# Patient Record
(demographics unavailable — no encounter records)

---

## 2025-01-22 NOTE — ADDENDUM
[FreeTextEntry1] :  Documented by Norris Hill acting as a scribe for Dr. DIAZ [facility], on 01/22/2025.

## 2025-01-22 NOTE — ASSESSMENT
[FreeTextEntry1] : Shara is a 90 y/o F who is here for a 6 month f/u visit for Right breast lumpectomy for DCIs from 7/16/24. ER/UT negative Pt is healing well. On physical exam, everything looks normal. Pt does not have any other complaints at this time.  total time on encounter: 40 mins PLAN: - b/l Mammo in 4 months  - f/u after imaging with the PA

## 2025-01-22 NOTE — HISTORY OF PRESENT ILLNESS
[FreeTextEntry1] : Shara is a 90 y/o F who is here for a 6 month f/u visit for Right breast lumpectomy for DCIs from 7/16/24. ER/CA negative Pt states of doing well. Pt denies of taking any medications. Pt does not have any breast related complaints at this time.   5/1/24: Mammo--> Bi-Rads 4 Indeterminant calcifications at R breast 5:00 location 1.9cm.  5/28/24: R Breast biopsy (buckle) R breast 5:00 CALS, DCIS high nuclear grade, micropapillary and solid types associated with necrosis and microcalcifications R breast 5:00 tissue, DCIS high nuclear grade, micropapillary and solid types associated with necrosis and microcalcifications  06/14/2024 Tissue Bx  Final Diagnosis Two (2) outside slides for review from [facility] labeled "[id number]", clinically "right breast 5:00 axis calcifications", stereotactic guided vacuum-assisted needle core biopsies: - Ductal carcinoma in situ (DCIS), micropapillary type with comedonecrosis and calcifications, high nuclear grade. - The carcinoma demonstrates by immunohistochemical stains (as per report):   Estrogen receptor:    Negative (<1% nuclear staining)   Progesterone receptor:    Negative (<1% nuclear staining)  07/15/2024 Right Mammo  1. Breast, right LIQ 5:00 axis mass, localized lumpectomy: - Healing prior biopsy site with ductal carcinoma in situ (DCIS), micropapillary and clinging type with both comedonecrosis and calcifications, high nuclear grade; present in eleven out of eighteen slides examined from this entirely submitted specimen (11/18). - For final surgical margin status please see specimen parts #2-#7 below. - Atrophic fatty breast tissue with an intraductal papilloma and proliferative type fibrocystic changes. - For hormone receptor protein expression status please see the pathology report from the prior needle core biopsy specimen ([id number]). - AJCC 8th Edition Pathologic Stage: pTis (DCIS), pNx, pMx.  2. Breast, right posterior margin, excision: - Benign atrophic fatty breast tissue without histopathologic abnormality. Negative for carcinoma.  3. Breast, right medial margin, excision: - Benign atrophic fatty breast tissue without histopathologic abnormality. Negative for carcinoma.  4. Breast, right superior margin, excision: - Benign atrophic fatty breast tissue without histopathologic abnormality. Negative for carcinoma.  5. Breast, right inferior margin, excision: - Benign atrophic fatty breast tissue without histopathologic abnormality. Negative for carcinoma.  6. Breast, right lateral margin, excision: - Benign atrophic fatty breast tissue containing a small radial sclerosing lesion (radial scar) and proliferative type fibrocystic changes. Negative for carcinoma.  7. Breast, right anterior margin, excision: - Focal high-grade intraductal carcinoma (DCIS) located 4.5 mm from the nearest new inked margin (microscopic measurement).  01/16/2025 Right dx Mammo --> BIRADS2 -There are scattered areas of fibroglandular density. -Architectural distortion with fat necrosis in the right breast is consistent with post lumpectomy change.  -Skin thickening and trabecular coarsening are consistent with prior radiation. Recommendation: As per post lumpectomy routine, a follow-up bilateral mammogram is recommended in 4 months.  RISK FACTOR: - no FHx of breast CA - L breast ADH atypia which she had removed.

## 2025-01-22 NOTE — END OF VISIT
[FreeTextEntry4] :  All medical record entries made by the vadim were at my, LUZ Post, direction and personally dictated by me on 01/22/2025. I have reviewed the chart and agreed that the record accurately reflects my personal performance of the history, physical examination, assessment and plan. I have also personally directed, reviewed and agreed with the chart.

## 2025-01-22 NOTE — ASSESSMENT
[FreeTextEntry1] : Shara is a 90 y/o F who is here for a 6 month f/u visit for Right breast lumpectomy for DCIs from 7/16/24. ER/MT negative Pt is healing well. On physical exam, everything looks normal. Pt does not have any other complaints at this time.  total time on encounter: 40 mins PLAN: - b/l Mammo in 4 months  - f/u after imaging with the PA

## 2025-01-22 NOTE — ADDENDUM
[FreeTextEntry1] :  Documented by Norris Hill acting as a scribe for Dr. DIAZ [facility], on 01/22/2025.

## 2025-01-22 NOTE — DATA REVIEWED
[FreeTextEntry1] : Patient:     MARTY HERNANDEZ  Accession:                             [id number]  Collected Date/Time:                   6/14/2024 10:58 EDT Received Date/Time:                    6/14/2024 10:58 EDT  Surgical Pathology Consultation Report - Auth (Verified)  Specimen(s) Submitted Submitted slide pathology  Final Diagnosis Two (2) outside slides for review from [facility] labeled "[id number]", clinically "right breast 5:00 axis calcifications", stereotactic guided vacuum-assisted needle core biopsies: - Ductal carcinoma in situ (DCIS), micropapillary type with  comedonecrosis and calcifications, high nuclear grade. - The carcinoma demonstrates by immunohistochemical stains (as per report):   Estrogen receptor:    Negative (<1% nuclear staining)   Progesterone receptor:    Negative (<1% nuclear staining)  One (1) outside slide for review from [facility] labeled "[id number]", clinically "right breast 5:00 axis tissue", stereotactic guided vacuum-assisted needle core biopsies: - Ductal carcinoma in situ (DCIS), micropapillary and papillary types with comedonecrosis and calcifications, high nuclear grade. 	 [facility] Accession Number : [id number] Patient:     MARTY HERNANDEZ   Accession:                             [id number]  Collected Date/Time:                   7/16/2024 12:54 EDT Received Date/Time:                    7/16/2024 14:13 EDT  Surgical Pathology Report - Auth (Verified)  Specimen(s) Submitted 1  Right breast Time obtained: 12:54 pm Cold ischemic time <1 hour 2  Right breast final posterior margin Time obtained: 1:01 pm Cold ischemic time <1 hour 3  Right breast final medial margin Time obtained: 1:02 pm Cold ischemic time <1 hour 4  Right breast final superior margin Time obtained: 1:02 pm Cold ischemic time <1 hour 5  Right breast final inferior margin Time obtained: 1:03 pm Cold ischemic time <1 hour 6  Right breast final lateral margin Time obtained: 1:04 pm Cold ischemic time <1 hour 7  Right breast final anterior margin Time obtained: 1:05 pm Cold ischemic time <1 hour   Final Diagnosis 1.  Breast, right LIQ 5:00 axis mass, localized lumpectomy: - Healing prior biopsy site with ductal carcinoma in situ (DCIS), micropapillary and clinging type with both comedonecrosis and calcifications, high nuclear grade; present in eleven out of eighteen slides examined from this entirely submitted specimen (11/18). - For final surgical margin status please see specimen parts #2-#7 below. - Atrophic fatty breast tissue with an intraductal papilloma and proliferative type fibrocystic changes. - For hormone receptor protein expression status please see the pathology report from the prior needle core biopsy specimen ([id number]). - AJCC 8th Edition Pathologic Stage: pTis (DCIS), pNx, pMx.  2.  Breast, right posterior margin, excision: - Benign atrophic fatty breast tissue without histopathologic abnormality.  Negative for carcinoma.  3.  Breast, right medial margin, excision: - Benign atrophic fatty breast tissue without histopathologic abnormality.  Negative for carcinoma.  4.  Breast, right superior margin, excision: - Benign atrophic fatty breast tissue without histopathologic abnormality.  Negative for carcinoma.  5.  Breast, right inferior margin, excision: - Benign atrophic fatty breast tissue without histopathologic abnormality.  Negative for carcinoma.  6.  Breast, right lateral margin, excision: - Benign atrophic fatty breast tissue containing a small radial sclerosing lesion (radial scar) and proliferative type fibrocystic changes.  Negative for carcinoma.  7.  Breast, right anterior margin, excision: - Focal high-grade intraductal carcinoma (DCIS) located 4.5 mm from the nearest new inked margin (microscopic measurement).  Verified by: Crow Mott M.D. (Electronic Signature) Reported on: 07/18/24 13:24 EDT, [facility], [address] [place] [address] Phone: [phone]   Fax: [phone] _________________________________________________________________  Synoptic Summary 1: Breast DCIS - Resection Specimen Procedure:  Excision (less than total mastectomy)  Specimen Laterality:   Right Tumor Tumor Site:  Lower inner quadrant;  Clock position Specify Clock Position:   5 o'clock Histologic Type:   Ductal carcinoma in situ Size (Extent) of DCIS:   Cannot be determined Number of Blocks with DCIS:   12 Number of Blocks Examined:   24 Architectural Patterns:   [place];  Micropapillary;  [place] Nuclear Grade:   Grade III (high) Necrosis:  Present, central (expansive "comedo" necrosis) Microcalcifications:   Present in DCIS Margins Margin Status:   All margins negative for DCIS Distance from DCIS to Closest Margin:    4.5 mm Distance from DCIS to Anterior Margin:    4.5 mm Regional Lymph Nodes Regional Lymph Node Status:   Not applicable (no regional lymph nodes submitted or found) Pathologic Stage Classification (pTNM, AJCC 8th Edition) pT Category:   pTis (DCIS) pN Category:   pN not assigned (no nodes submitted or found) Prior Biopsy ([facility] Standard 2.7) A biopsy was performed and read elsewhere and reviewed: [id number] Additional Findings Additional Findings:   Intraductal papilloma, radial scar Special Studies Estrogen Receptor (ER) Status:   Negative Progesterone Receptor (PgR) Status:    Negative Testing Performed on Case Number:    [id number] Best Tumor Blocks for Future Studies Tumor Block(s):   1G  CAP eCC 2023 Q3 Release  Clinical Information Right lumpectomy   Perioperative Diagnosis Right DCIS  Postoperative Diagnosis Right DCIS  Gross Description 1. The specimen is received fresh, labeled "right breast lumpectomy" and consists of a breast lumpectomy specimen weighing 22 g and measuring 5 cm superior to inferior, 5.5 cm medial to lateral and 1.5 cm anterior to posterior.  The specimen is inked as follows: superior - blue, inferior - green, lateral - yellow, medial - orange, anterior - red, posterior - black. The specimen is serially sectioned from lateral to medial. The cut surface of the breast tissue appears homogeneous yellow and lobulated with tan, white fibrocystic changes. The specimen is entirely submitted.   Summary of Sections: 8M-5J-gurqwcppgyyfs sections-lateral margin-2 1C-1D-full section-2 1E-1F-full section-2 1G-1H-full section-2 1I-1J-full section-2 1K-1L-full section-2 1M-1N-full section-2 1 L-1-1P-full section-2 1Q-full section-1 1R-perpendicular sections-medial margin-1  Total:  18 blocks  2.  The specimen is received fresh, labeled "right breast final posterior margin clip marks true margin" and consists of a fragment of yellow lobulated adipose tissue weighing 1 g and measuring 2.5 x 2.0 x 0.5 cm. The clipped new margin is inked black. The old margin is inked green. The cut surface shows homogeneous yellow adipose tissue.  The specimen is submitted entirely. (1 block)  3.  The specimen is received fresh, labeled "right breast final medial margin, clip marks true margin" and consists of a fragment of yellow lobulated adipose tissue weighing <1 g and measuring 1.5 x 1.4 x 0.7 cm. The clipped new margin is inked black. The old margin is inked green. The cut surface shows homogeneous yellow adipose tissue.  The specimen is submitted entirely. (1 block)  4.  The specimen is received fresh, labeled "right breast final superior margin, clip marks true margin" and consists of a fragment of yellow lobulated adipose tissue weighing <1 g and measuring 1.1 x 1.0 x 0.6 cm. The clipped new margin is inked black. The old margin is inked green. The cut surface shows homogeneous yellow adipose tissue.  The specimen is submitted entirely. (1 block)  5.  The specimen is received fresh, labeled "right breast final inferior margin, clip marks true margin" and consists of a fragment of yellow lobulated adipose tissue weighing <1 g and measuring 1.7 x 1.1 x 0.4 cm. The clipped new margin is inked black. The old margin is inked green. The cut surface shows homogeneous yellow adipose tissue.  The specimen is submitted entirely. (1 block)  6.  The specimen is received fresh, labeled "right breast final lateral margin clip marks true margin" and consists of a fragment of yellow lobulated adipose tissue weighing <1 g and measuring 1.6 x 1.1 x 0.4 cm. The clipped new margin is inked black. The old margin is inked green. The cut surface shows homogeneous yellow adipose tissue.  The specimen is submitted entirely. (1 block)  7.  The specimen is received fresh, labeled "right breast final anterior margin, clip marks true margin" and consists of a fragment of yellow lobulated adipose tissue weighing <1 g and measuring 1.4 x 0.9 x 0.4 cm. The clipped new margin is inked black. The old margin is inked green. The cut surface shows homogeneous yellow adipose tissue.  The specimen is submitted entirely. (1 block)  ACC: [id number]     EXAM:  MG MAMMO DIAG W RONNIE RT#   ORDERED BY: LUZ DIAZ  PROCEDURE DATE:  01/16/2025    INTERPRETATION:  CLINICAL HISTORY: Personal history of right breast cancer status post lumpectomy in 7/2024.  The patient reports her last clinical breast examination was performed within the past year.  COMPARISON: [facility] dating back to 2022.  FAMILY HISTORY: No family history of breast cancer.  Views obtained: Right full field digital 2D and digital tomosynthesis images as well as magnification views.  Computer-aided detection was utilized in the interpretation of this examination.  BREAST COMPOSITION: There are scattered areas of fibroglandular density.  FINDINGS:  Architectural distortion with fat necrosis in the right breast is consistent with post lumpectomy change. Skin thickening and trabecular coarsening are consistent with prior radiation.  No suspicious masses or calcifications are seen.  IMPRESSION:  1. Benign post lumpectomy changes in the right breast.  2. No mammographic evidence of malignancy in the right breast.   Recommendation: As per post lumpectomy routine, a follow-up bilateral mammogram is recommended in 4 months.  BI-RADS Category 2: Benign  Findings and recommendations were discussed with the patient.  Breast Arterial Calcification (PATIENCE): Grade 2 -  Coarse vascular calcification or tram track calcification affecting fewer than 3 vessels per mammographic view. Note: A strong association between breast arterial calcification and cardiovascular disease has been reported. Correlation with other cardiovascular risk factors is recommended.  The above findings and recommendations were discussed with the patient at the time of the examination.  --- End of Report ---

## 2025-01-22 NOTE — HISTORY OF PRESENT ILLNESS
[FreeTextEntry1] : Shara is a 90 y/o F who is here for a 6 month f/u visit for Right breast lumpectomy for DCIs from 7/16/24. ER/IN negative Pt states of doing well. Pt denies of taking any medications. Pt does not have any breast related complaints at this time.   5/1/24: Mammo--> Bi-Rads 4 Indeterminant calcifications at R breast 5:00 location 1.9cm.  5/28/24: R Breast biopsy (buckle) R breast 5:00 CALS, DCIS high nuclear grade, micropapillary and solid types associated with necrosis and microcalcifications R breast 5:00 tissue, DCIS high nuclear grade, micropapillary and solid types associated with necrosis and microcalcifications  06/14/2024 Tissue Bx  Final Diagnosis Two (2) outside slides for review from [facility] labeled "[id number]", clinically "right breast 5:00 axis calcifications", stereotactic guided vacuum-assisted needle core biopsies: - Ductal carcinoma in situ (DCIS), micropapillary type with comedonecrosis and calcifications, high nuclear grade. - The carcinoma demonstrates by immunohistochemical stains (as per report):   Estrogen receptor:    Negative (<1% nuclear staining)   Progesterone receptor:    Negative (<1% nuclear staining)  07/15/2024 Right Mammo  1. Breast, right LIQ 5:00 axis mass, localized lumpectomy: - Healing prior biopsy site with ductal carcinoma in situ (DCIS), micropapillary and clinging type with both comedonecrosis and calcifications, high nuclear grade; present in eleven out of eighteen slides examined from this entirely submitted specimen (11/18). - For final surgical margin status please see specimen parts #2-#7 below. - Atrophic fatty breast tissue with an intraductal papilloma and proliferative type fibrocystic changes. - For hormone receptor protein expression status please see the pathology report from the prior needle core biopsy specimen ([id number]). - AJCC 8th Edition Pathologic Stage: pTis (DCIS), pNx, pMx.  2. Breast, right posterior margin, excision: - Benign atrophic fatty breast tissue without histopathologic abnormality. Negative for carcinoma.  3. Breast, right medial margin, excision: - Benign atrophic fatty breast tissue without histopathologic abnormality. Negative for carcinoma.  4. Breast, right superior margin, excision: - Benign atrophic fatty breast tissue without histopathologic abnormality. Negative for carcinoma.  5. Breast, right inferior margin, excision: - Benign atrophic fatty breast tissue without histopathologic abnormality. Negative for carcinoma.  6. Breast, right lateral margin, excision: - Benign atrophic fatty breast tissue containing a small radial sclerosing lesion (radial scar) and proliferative type fibrocystic changes. Negative for carcinoma.  7. Breast, right anterior margin, excision: - Focal high-grade intraductal carcinoma (DCIS) located 4.5 mm from the nearest new inked margin (microscopic measurement).  01/16/2025 Right dx Mammo --> BIRADS2 -There are scattered areas of fibroglandular density. -Architectural distortion with fat necrosis in the right breast is consistent with post lumpectomy change.  -Skin thickening and trabecular coarsening are consistent with prior radiation. Recommendation: As per post lumpectomy routine, a follow-up bilateral mammogram is recommended in 4 months.  RISK FACTOR: - no FHx of breast CA - L breast ADH atypia which she had removed.

## 2025-01-22 NOTE — DATA REVIEWED
[FreeTextEntry1] : Patient:     MARTY HERNANDEZ  Accession:                             [id number]  Collected Date/Time:                   6/14/2024 10:58 EDT Received Date/Time:                    6/14/2024 10:58 EDT  Surgical Pathology Consultation Report - Auth (Verified)  Specimen(s) Submitted Submitted slide pathology  Final Diagnosis Two (2) outside slides for review from [facility] labeled "[id number]", clinically "right breast 5:00 axis calcifications", stereotactic guided vacuum-assisted needle core biopsies: - Ductal carcinoma in situ (DCIS), micropapillary type with  comedonecrosis and calcifications, high nuclear grade. - The carcinoma demonstrates by immunohistochemical stains (as per report):   Estrogen receptor:    Negative (<1% nuclear staining)   Progesterone receptor:    Negative (<1% nuclear staining)  One (1) outside slide for review from [facility] labeled "[id number]", clinically "right breast 5:00 axis tissue", stereotactic guided vacuum-assisted needle core biopsies: - Ductal carcinoma in situ (DCIS), micropapillary and papillary types with comedonecrosis and calcifications, high nuclear grade. 	 [facility] Accession Number : [id number] Patient:     MARTY HERNANDEZ   Accession:                             [id number]  Collected Date/Time:                   7/16/2024 12:54 EDT Received Date/Time:                    7/16/2024 14:13 EDT  Surgical Pathology Report - Auth (Verified)  Specimen(s) Submitted 1  Right breast Time obtained: 12:54 pm Cold ischemic time <1 hour 2  Right breast final posterior margin Time obtained: 1:01 pm Cold ischemic time <1 hour 3  Right breast final medial margin Time obtained: 1:02 pm Cold ischemic time <1 hour 4  Right breast final superior margin Time obtained: 1:02 pm Cold ischemic time <1 hour 5  Right breast final inferior margin Time obtained: 1:03 pm Cold ischemic time <1 hour 6  Right breast final lateral margin Time obtained: 1:04 pm Cold ischemic time <1 hour 7  Right breast final anterior margin Time obtained: 1:05 pm Cold ischemic time <1 hour   Final Diagnosis 1.  Breast, right LIQ 5:00 axis mass, localized lumpectomy: - Healing prior biopsy site with ductal carcinoma in situ (DCIS), micropapillary and clinging type with both comedonecrosis and calcifications, high nuclear grade; present in eleven out of eighteen slides examined from this entirely submitted specimen (11/18). - For final surgical margin status please see specimen parts #2-#7 below. - Atrophic fatty breast tissue with an intraductal papilloma and proliferative type fibrocystic changes. - For hormone receptor protein expression status please see the pathology report from the prior needle core biopsy specimen ([id number]). - AJCC 8th Edition Pathologic Stage: pTis (DCIS), pNx, pMx.  2.  Breast, right posterior margin, excision: - Benign atrophic fatty breast tissue without histopathologic abnormality.  Negative for carcinoma.  3.  Breast, right medial margin, excision: - Benign atrophic fatty breast tissue without histopathologic abnormality.  Negative for carcinoma.  4.  Breast, right superior margin, excision: - Benign atrophic fatty breast tissue without histopathologic abnormality.  Negative for carcinoma.  5.  Breast, right inferior margin, excision: - Benign atrophic fatty breast tissue without histopathologic abnormality.  Negative for carcinoma.  6.  Breast, right lateral margin, excision: - Benign atrophic fatty breast tissue containing a small radial sclerosing lesion (radial scar) and proliferative type fibrocystic changes.  Negative for carcinoma.  7.  Breast, right anterior margin, excision: - Focal high-grade intraductal carcinoma (DCIS) located 4.5 mm from the nearest new inked margin (microscopic measurement).  Verified by: Crow Mott M.D. (Electronic Signature) Reported on: 07/18/24 13:24 EDT, [facility], [address] [place] [address] Phone: [phone]   Fax: [phone] _________________________________________________________________  Synoptic Summary 1: Breast DCIS - Resection Specimen Procedure:  Excision (less than total mastectomy)  Specimen Laterality:   Right Tumor Tumor Site:  Lower inner quadrant;  Clock position Specify Clock Position:   5 o'clock Histologic Type:   Ductal carcinoma in situ Size (Extent) of DCIS:   Cannot be determined Number of Blocks with DCIS:   12 Number of Blocks Examined:   24 Architectural Patterns:   Comedo;  Micropapillary;  Clinging Nuclear Grade:   Grade III (high) Necrosis:  Present, central (expansive "comedo" necrosis) Microcalcifications:   Present in DCIS Margins Margin Status:   All margins negative for DCIS Distance from DCIS to Closest Margin:    4.5 mm Distance from DCIS to Anterior Margin:    4.5 mm Regional Lymph Nodes Regional Lymph Node Status:   Not applicable (no regional lymph nodes submitted or found) Pathologic Stage Classification (pTNM, AJCC 8th Edition) pT Category:   pTis (DCIS) pN Category:   pN not assigned (no nodes submitted or found) Prior Biopsy ([facility] Standard 2.7) A biopsy was performed and read elsewhere and reviewed: [id number] Additional Findings Additional Findings:   Intraductal papilloma, radial scar Special Studies Estrogen Receptor (ER) Status:   Negative Progesterone Receptor (PgR) Status:    Negative Testing Performed on Case Number:    [id number] Best Tumor Blocks for Future Studies Tumor Block(s):   1G  CAP eCC 2023 Q3 Release  Clinical Information Right lumpectomy   Perioperative Diagnosis Right DCIS  Postoperative Diagnosis Right DCIS  Gross Description 1. The specimen is received fresh, labeled "right breast lumpectomy" and consists of a breast lumpectomy specimen weighing 22 g and measuring 5 cm superior to inferior, 5.5 cm medial to lateral and 1.5 cm anterior to posterior.  The specimen is inked as follows: superior - blue, inferior - green, lateral - yellow, medial - orange, anterior - red, posterior - black. The specimen is serially sectioned from lateral to medial. The cut surface of the breast tissue appears homogeneous yellow and lobulated with tan, white fibrocystic changes. The specimen is entirely submitted.   Summary of Sections: 7J-1L-ueavruewpdowb sections-lateral margin-2 1C-1D-full section-2 1E-1F-full section-2 1G-1H-full section-2 1I-1J-full section-2 1K-1L-full section-2 1M-1N-full section-2 1 L-1-1P-full section-2 1Q-full section-1 1R-perpendicular sections-medial margin-1  Total:  18 blocks  2.  The specimen is received fresh, labeled "right breast final posterior margin clip marks true margin" and consists of a fragment of yellow lobulated adipose tissue weighing 1 g and measuring 2.5 x 2.0 x 0.5 cm. The clipped new margin is inked black. The old margin is inked green. The cut surface shows homogeneous yellow adipose tissue.  The specimen is submitted entirely. (1 block)  3.  The specimen is received fresh, labeled "right breast final medial margin, clip marks true margin" and consists of a fragment of yellow lobulated adipose tissue weighing <1 g and measuring 1.5 x 1.4 x 0.7 cm. The clipped new margin is inked black. The old margin is inked green. The cut surface shows homogeneous yellow adipose tissue.  The specimen is submitted entirely. (1 block)  4.  The specimen is received fresh, labeled "right breast final superior margin, clip marks true margin" and consists of a fragment of yellow lobulated adipose tissue weighing <1 g and measuring 1.1 x 1.0 x 0.6 cm. The clipped new margin is inked black. The old margin is inked green. The cut surface shows homogeneous yellow adipose tissue.  The specimen is submitted entirely. (1 block)  5.  The specimen is received fresh, labeled "right breast final inferior margin, clip marks true margin" and consists of a fragment of yellow lobulated adipose tissue weighing <1 g and measuring 1.7 x 1.1 x 0.4 cm. The clipped new margin is inked black. The old margin is inked green. The cut surface shows homogeneous yellow adipose tissue.  The specimen is submitted entirely. (1 block)  6.  The specimen is received fresh, labeled "right breast final lateral margin clip marks true margin" and consists of a fragment of yellow lobulated adipose tissue weighing <1 g and measuring 1.6 x 1.1 x 0.4 cm. The clipped new margin is inked black. The old margin is inked green. The cut surface shows homogeneous yellow adipose tissue.  The specimen is submitted entirely. (1 block)  7.  The specimen is received fresh, labeled "right breast final anterior margin, clip marks true margin" and consists of a fragment of yellow lobulated adipose tissue weighing <1 g and measuring 1.4 x 0.9 x 0.4 cm. The clipped new margin is inked black. The old margin is inked green. The cut surface shows homogeneous yellow adipose tissue.  The specimen is submitted entirely. (1 block)  ACC: [id number]     EXAM:  MG MAMMO DIAG W RONNIE RT#   ORDERED BY: LUZ DIAZ  PROCEDURE DATE:  01/16/2025    INTERPRETATION:  CLINICAL HISTORY: Personal history of right breast cancer status post lumpectomy in 7/2024.  The patient reports her last clinical breast examination was performed within the past year.  COMPARISON: [facility] dating back to 2022.  FAMILY HISTORY: No family history of breast cancer.  Views obtained: Right full field digital 2D and digital tomosynthesis images as well as magnification views.  Computer-aided detection was utilized in the interpretation of this examination.  BREAST COMPOSITION: There are scattered areas of fibroglandular density.  FINDINGS:  Architectural distortion with fat necrosis in the right breast is consistent with post lumpectomy change. Skin thickening and trabecular coarsening are consistent with prior radiation.  No suspicious masses or calcifications are seen.  IMPRESSION:  1. Benign post lumpectomy changes in the right breast.  2. No mammographic evidence of malignancy in the right breast.   Recommendation: As per post lumpectomy routine, a follow-up bilateral mammogram is recommended in 4 months.  BI-RADS Category 2: Benign  Findings and recommendations were discussed with the patient.  Breast Arterial Calcification (PATIENCE): Grade 2 -  Coarse vascular calcification or tram track calcification affecting fewer than 3 vessels per mammographic view. Note: A strong association between breast arterial calcification and cardiovascular disease has been reported. Correlation with other cardiovascular risk factors is recommended.  The above findings and recommendations were discussed with the patient at the time of the examination.  --- End of Report ---

## 2025-03-31 NOTE — DISCUSSION/SUMMARY
[de-identified] : Chief complaint: Left shoulder injury  HPI: Patient is a 91-year-old right-hand-dominant female who presents to the office today for the evaluation of a left shoulder/clavicle injury sustained last Thursday, 3/27/2025.  Patient reports that she fell down her basement steps.  She presented to [facility] at which time she reports x-rays were taken.  She reports at that time being told that she had a clavicle fracture.  She was told to follow-up with orthopedics.  Denies any previous fracture or surgical intervention to the left shoulder or clavicle.  ROS: Positive for left shoulder injury/clavicle fracture  Physical examination of the left shoulder/clavicle:  There is edema and ecchymosis noted overlying the left anterior clavicle as well as to the left shoulder diffusely and over the left posterior trapezius No appreciable erythema Skin appears to be intact Patient is good range of motion to the left elbow in flexion, extension, supination, pronation Good range of motion to the left wrist and to the left hand There is tenderness to palpation over the lateral aspect of the left clavicle as well as over the diffuse left shoulder There is some tenderness to palpation over the proximal aspect of the left humerus Distal sensation is grossly intact light touch Capillary refills less than 2 seconds   2 view x-rays of the left clavicle performed in the office today show an acute closed displaced fracture of the lateral end of the left clavicle  Three-view x-rays of the left shoulder performed in the office today show an acute closed displaced fracture of the lateral end of the left clavicle  Assessment/plan: Acute closed displaced fracture of the lateral end of the left clavicle, discussed with the patient the fractures typically take 6-8 weeks to heal/resolve, discussed ongoing treatment options with the patient  1.  Today the patient will be placed in a left upper extremity sling, I recommend that she wear this at all times with the exception of for showering/bathing, she should come out of the sling several times a day while keeping the left shoulder immobilized to perform active range of motion of the left elbow to avoid stiffness/contracture development, patient should also continue to perform active range of motion of the left wrist and left hand to avoid stiffness/contracture development, she is to remain nonweightbearing to the left upper extremity, she is to avoid range of motion of the left shoulder 2.  Patient can apply ice or heat to the affected area on an as-needed basis with sensory precautions 3.  She can take over-the-counter Tylenol on an as-needed basis for pain  Patient will be provided with a 2-week follow-up with Dr. Weaver or with Dr. Aguayo for repeat evaluation, patient verbalized understanding of all findings in the office today, agrees to follow-up as directed

## 2025-04-15 NOTE — ASSESSMENT
[No evidence of disease] : No evidence of disease [Work-up necessary to assess local, regional or metastatic recurrence] : Work-up necessary to assess local, regional or metastatic recurrence no

## 2025-04-16 NOTE — PHYSICAL EXAM
[General Appearance - Well Developed] : well developed [General Appearance - Alert] : alert [General Appearance - In No Acute Distress] : in no acute distress [] : no respiratory distress [Breast Palpation Mass] : no palpable masses [Cervical Lymph Nodes Enlarged Posterior Bilaterally] : posterior cervical [Cervical Lymph Nodes Enlarged Anterior Bilaterally] : anterior cervical [Supraclavicular Lymph Nodes Enlarged Bilaterally] : supraclavicular [Axillary Lymph Nodes Enlarged Bilaterally] : axillary [Musculoskeletal - Swelling] : no joint swelling [No Focal Deficits] : no focal deficits [Oriented To Time, Place, And Person] : oriented to person, place, and time [de-identified] : limited exam r/t left clavicle fracture/ From what we can assess to right breast: No masses noted/ skin healed well/ Bruising noted to left breast:  [de-identified] : left arm sling/immobilzer in place/ right arm good ROM noted

## 2025-04-16 NOTE — REVIEW OF SYSTEMS
[Loss of Hearing] : loss of hearing [Negative] : Allergic/Immunologic [Joint Pain] : joint pain [Fatigue] : no fatigue [FreeTextEntry2] : Takes 14 vitamins daily/ supplements [FreeTextEntry4] : b/l hearing loss [FreeTextEntry9] : Arthritis/ left clavicle fracture on 4/3/25

## 2025-04-16 NOTE — END OF VISIT
[FreeTextEntry3] : MD Note: I personally performed the evaluation and management services for this patient. This includes conducting the examination, assessing all conditions, and establishing the plan of care. Today, my ACP, Columba Castillo, was here to observe my evaluation and management services for this patient. I agree with the documentation above, which I have reviewed and edited where appropriate. [Time Spent: ___ minutes] : I have spent [unfilled] minutes of time on the encounter which excludes teaching and separately reported services.

## 2025-04-16 NOTE — DISEASE MANAGEMENT
[Pathological] : TNM Stage: p [0] : 0 [TTNM] : is [NTNM] : 0 [MTNM] : 0 [de-identified] : 2606cGy [de-identified] : 2603cVk [de-identified] : Right breast

## 2025-04-16 NOTE — HISTORY OF PRESENT ILLNESS
[FreeTextEntry1] : 4/15/2025: Follow up: 5Fx/2600cGy to the right breast from 8/21/24-8/27/2024.  MARTY HERNANDEZ returns to clinic in follow up visit.  As you know, the patient is a 91-year-old F with a diagnosis of Right breast DCIS, ER/MN negative, High grade.  The patient received 2600cGy/5Fx to the right breast from 8/21/24 through 8/27/24.  I last saw her in September.  In the interim, she reports feeling well. She denies any pain to right breast that was treated. She fell 2 weeks ago from 6 steps in her basement and suffered a left clavicle fracture and possible rib fracture but is managing. She is currently in an immobilizer. She also has some bruising to left breast from the fall, but it is healing. She takes Tylenol for pain, and it helps. She has noticed in the past 2 weeks sleeping is getting a little more comfortable than right after the fall. She will have an ortho follow up and repeat x-ray this Thursday. On another note, she shares good news with staff at today's visit that she recently got engaged on Valentines Day.   She is due for a bilateral diagnostic mammogram on May19. She is currently unable to lift her left arm due to fracture but will see how she feels the week of the mammogram and will decide if she will have to reschedule.   1/16/2025 Right breast diagnostic mammogram:  IMPRESSION: 1. Benign post lumpectomy changes in the right breast. 2. No mammographic evidence of malignancy in the right breast. Recommendation: As per post lumpectomy routine, a follow-up bilateral mammogram is recommended in 4 months. BI-RADS Category 2: Benign Breast Arterial Calcification (PATIENCE): Grade 2 -  Coarse vascular calcification or tram track calcification affecting fewer than 3 vessels per mammographic view. Note: A strong association between breast arterial calcification and cardiovascular disease has been reported. Correlation with other cardiovascular risk factors is recommended.  Upcoming appointments:  Dr Macdonald 5/28/2025 Dr. Weaver (ortho) 4/17/25 9/25/2024: 5Fx/2600cGy to the right breast from 8/21/24-8/27/2024.  MARTY HERNANDEZ returns to clinic in follow up visit.  As you know, the patient is a 91 year old F with a diagnosis of Right breast DCIS, ER/MN negative, High grade.  The patient received 2600cGy/5Fx to the right breast from 8/21/24 through 8/27/24.  I last saw her in August.   In the interim, the patient reports feeling well. She does not recall having any side effects during or after treatment. She continues following up with  and her next Breast imaging is scheduled in early January. She gets mild "pinch" to right breast but not too bothersome. She continues her 14 supplements/vitamins daily and feels well.        8/27/2024 OTV: 5/5 treatments to the right: Patient reports doing well. She has no complaints of pain, discomfort or fatigue. Give d/c instructions and follow up appointment.  The patient is a 91 year  old woman who was recently noted on screening mammogram 4/18/24 to have "grouped calcifications in the medial right breast and small asymmetry superiorly in the right breast. Diagnostic mammogram and ultrasound were recommended.   Diagnostic mammogram and ultrasound on 5/1/24 showed indeterminant calcifications in the right breast at 5:00 location for which stereotactic core biopsy was recommended.    On 5/28/2024, she had a biopsy of the right breast abnormality (tissue and calcifications) at 5 o'clock and pathology showed DCIS, high nuclear grade, micropapillary and solid types, associated with necrosis and microcalcifications.  ER negative, MN negative.   On 7/16/24, She underwent a lumpectomy with .  She was found to have DCIS, micropapillary and clinging type with comedonecrosis and calcifications, high nuclear grade, present in eleven out of 18 slides examined (TisNxMx), ER Negative, MN Negative.  Synoptic Summary 1: Breast DCIS - Resection Specimen Procedure:  Excision (less than total mastectomy) Specimen Laterality:   Right Tumor Tumor Site:  Lower inner quadrant;  Clock position Specify Clock Position:   5 o'clock Histologic Type:   Ductal carcinoma in situ Size (Extent) of DCIS:   Cannot be determined Number of Blocks with DCIS:   12 Number of Blocks Examined:   24 Architectural Patterns:   Comedo;  Micropapillary;  Clinging Nuclear Grade:   Grade III (high) Necrosis:  Present, central (expansive "comedo" necrosis) Microcalcifications:   Present in DCIS Margins Margin Status:   All margins negative for DCIS Distance from DCIS to Closest Margin:    4.5 mm Distance from DCIS to Anterior Margin:    4.5 mm Regional Lymph Nodes Regional Lymph Node Status:   Not applicable (no regional lymph nodes submitted or found) Pathologic Stage Classification (pTNM, AJCC 8th Edition) pT Category:   pTis (DCIS) pN Category:   pN not assigned (no nodes submitted or found).    She has been doing well since surgery.  She  is here to discuss radiation. Med/Onc: Dr. Hoover 8/8/24

## 2025-04-16 NOTE — PHYSICAL EXAM
[General Appearance - Well Developed] : well developed [General Appearance - Alert] : alert [General Appearance - In No Acute Distress] : in no acute distress [] : no respiratory distress [Breast Palpation Mass] : no palpable masses [Cervical Lymph Nodes Enlarged Posterior Bilaterally] : posterior cervical [Cervical Lymph Nodes Enlarged Anterior Bilaterally] : anterior cervical [Supraclavicular Lymph Nodes Enlarged Bilaterally] : supraclavicular [Axillary Lymph Nodes Enlarged Bilaterally] : axillary [Musculoskeletal - Swelling] : no joint swelling [No Focal Deficits] : no focal deficits [Oriented To Time, Place, And Person] : oriented to person, place, and time [de-identified] : limited exam r/t left clavicle fracture/ From what we can assess to right breast: No masses noted/ skin healed well/ Bruising noted to left breast:  [de-identified] : left arm sling/immobilzer in place/ right arm good ROM noted

## 2025-04-16 NOTE — PHYSICAL EXAM
[General Appearance - Well Developed] : well developed [General Appearance - Alert] : alert [General Appearance - In No Acute Distress] : in no acute distress [] : no respiratory distress [Breast Palpation Mass] : no palpable masses [Cervical Lymph Nodes Enlarged Posterior Bilaterally] : posterior cervical [Cervical Lymph Nodes Enlarged Anterior Bilaterally] : anterior cervical [Supraclavicular Lymph Nodes Enlarged Bilaterally] : supraclavicular [Axillary Lymph Nodes Enlarged Bilaterally] : axillary [Musculoskeletal - Swelling] : no joint swelling [No Focal Deficits] : no focal deficits [Oriented To Time, Place, And Person] : oriented to person, place, and time [de-identified] : limited exam r/t left clavicle fracture/ From what we can assess to right breast: No masses noted/ skin healed well/ Bruising noted to left breast:  [de-identified] : left arm sling/immobilzer in place/ right arm good ROM noted

## 2025-04-16 NOTE — HISTORY OF PRESENT ILLNESS
[FreeTextEntry1] : 4/15/2025: Follow up: 5Fx/2600cGy to the right breast from 8/21/24-8/27/2024.  MARTY HERNANDEZ returns to clinic in follow up visit.  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  The patient received 2600cGy/5Fx to the right breast from 8/21/24 through 8/27/24.  I last saw her in September.  In the interim, she reports feeling well. She denies any pain to right breast that was treated. She fell 2 weeks ago from 6 steps in her basement and suffered a left clavicle fracture and possible rib fracture but is managing. She is currently in an immobilizer. She also has some bruising to left breast from the fall, but it is healing. She takes Tylenol for pain, and it helps. She has noticed in the past 2 weeks sleeping is getting a little more comfortable than right after the fall. She will have an ortho follow up and repeat x-ray this Thursday. On another note, she shares good news with staff at today's visit that she recently got engaged on Valentines Day.   She is due for a bilateral diagnostic mammogram on May19. She is currently unable to lift her left arm due to fracture but will see how she feels the week of the mammogram and will decide if she will have to reschedule.   1/16/2025 Right breast diagnostic mammogram:  IMPRESSION: 1. Benign post lumpectomy changes in the right breast. 2. No mammographic evidence of malignancy in the right breast. Recommendation: As per post lumpectomy routine, a follow-up bilateral mammogram is recommended in 4 months. BI-RADS Category 2: Benign Breast Arterial Calcification (PATIENCE): Grade 2 -  Coarse vascular calcification or tram track calcification affecting fewer than 3 vessels per mammographic view. Note: A strong association between breast arterial calcification and cardiovascular disease has been reported. Correlation with other cardiovascular risk factors is recommended.  Upcoming appointments:  Dr Macdonald 5/28/2025 Dr. Weaver (ortho) 4/17/25 9/25/2024: 5Fx/2600cGy to the right breast from 8/21/24-8/27/2024.  MARTY HERNANDEZ returns to clinic in follow up visit.  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  The patient received 2600cGy/5Fx to the right breast from 8/21/24 through 8/27/24.  I last saw her in August.   In the interim, the patient reports feeling well. She does not recall having any side effects during or after treatment. She continues following up with  and her next Breast imaging is scheduled in early January. She gets mild "pinch" to right breast but not too bothersome. She continues her 14 supplements/vitamins daily and feels well.        8/27/2024 OTV: 5/5 treatments to the right: Patient reports doing well. She has no complaints of pain, discomfort or fatigue. Give d/c instructions and follow up appointment.  The patient is a 91 year  old woman who was recently noted on screening mammogram 4/18/24 to have "grouped calcifications in the medial right breast and small asymmetry superiorly in the right breast. Diagnostic mammogram and ultrasound were recommended.   Diagnostic mammogram and ultrasound on 5/1/24 showed indeterminant calcifications in the right breast at 5:00 location for which stereotactic core biopsy was recommended.    On 5/28/2024, she had a biopsy of the right breast abnormality (tissue and calcifications) at 5 o'clock and pathology showed DCIS, high nuclear grade, micropapillary and solid types, associated with necrosis and microcalcifications.  ER negative, DC negative.   On 7/16/24, She underwent a lumpectomy with .  She was found to have DCIS, micropapillary and clinging type with comedonecrosis and calcifications, high nuclear grade, present in eleven out of 18 slides examined (TisNxMx), ER Negative, DC Negative.  Synoptic Summary 1: Breast DCIS - Resection Specimen Procedure:  Excision (less than total mastectomy) Specimen Laterality:   Right Tumor Tumor Site:  Lower inner quadrant;  Clock position Specify Clock Position:   5 o'clock Histologic Type:   Ductal carcinoma in situ Size (Extent) of DCIS:   Cannot be determined Number of Blocks with DCIS:   12 Number of Blocks Examined:   24 Architectural Patterns:   [place]edo;  Micropapillary;  Clinging Nuclear Grade:   Grade III (high) Necrosis:  Present, central (expansive "comedo" necrosis) Microcalcifications:   Present in DCIS Margins Margin Status:   All margins negative for DCIS Distance from DCIS to Closest Margin:    4.5 mm Distance from DCIS to Anterior Margin:    4.5 mm Regional Lymph Nodes Regional Lymph Node Status:   Not applicable (no regional lymph nodes submitted or found) Pathologic Stage Classification (pTNM, AJCC 8th Edition) pT Category:   pTis (DCIS) pN Category:   pN not assigned (no nodes submitted or found).    She has been doing well since surgery.  She  is here to discuss radiation. Med/Onc: Dr. Hoover 8/8/24

## 2025-04-16 NOTE — DISEASE MANAGEMENT
[Pathological] : TNM Stage: p [0] : 0 [TTNM] : is [NTNM] : 0 [MTNM] : 0 [de-identified] : 2601cGy [de-identified] : 2608cKv [de-identified] : Right breast

## 2025-04-16 NOTE — HISTORY OF PRESENT ILLNESS
[FreeTextEntry1] : 4/15/2025: Follow up: 5Fx/2600cGy to the right breast from 8/21/24-8/27/2024.  MARTY HERNANDEZ returns to clinic in follow up visit.  As you know, the patient is a 91-year-old F with a diagnosis of Right breast DCIS, ER/NV negative, High grade.  The patient received 2600cGy/5Fx to the right breast from 8/21/24 through 8/27/24.  I last saw her in September.  In the interim, she reports feeling well. She denies any pain to right breast that was treated. She fell 2 weeks ago from 6 steps in her basement and suffered a left clavicle fracture and possible rib fracture but is managing. She is currently in an immobilizer. She also has some bruising to left breast from the fall, but it is healing. She takes Tylenol for pain, and it helps. She has noticed in the past 2 weeks sleeping is getting a little more comfortable than right after the fall. She will have an ortho follow up and repeat x-ray this Thursday. On another note, she shares good news with staff at today's visit that she recently got engaged on Valentines Day.   She is due for a bilateral diagnostic mammogram on May19. She is currently unable to lift her left arm due to fracture but will see how she feels the week of the mammogram and will decide if she will have to reschedule.   1/16/2025 Right breast diagnostic mammogram:  IMPRESSION: 1. Benign post lumpectomy changes in the right breast. 2. No mammographic evidence of malignancy in the right breast. Recommendation: As per post lumpectomy routine, a follow-up bilateral mammogram is recommended in 4 months. BI-RADS Category 2: Benign Breast Arterial Calcification (PATIENCE): Grade 2 -  Coarse vascular calcification or tram track calcification affecting fewer than 3 vessels per mammographic view. Note: A strong association between breast arterial calcification and cardiovascular disease has been reported. Correlation with other cardiovascular risk factors is recommended.  Upcoming appointments:  Dr Macdonald 5/28/2025 Dr. Weaver (ortho) 4/17/25 9/25/2024: 5Fx/2600cGy to the right breast from 8/21/24-8/27/2024.  MARTY HERNANDEZ returns to clinic in follow up visit.  As you know, the patient is a 91 year old F with a diagnosis of Right breast DCIS, ER/NV negative, High grade.  The patient received 2600cGy/5Fx to the right breast from 8/21/24 through 8/27/24.  I last saw her in August.   In the interim, the patient reports feeling well. She does not recall having any side effects during or after treatment. She continues following up with  and her next Breast imaging is scheduled in early January. She gets mild "pinch" to right breast but not too bothersome. She continues her 14 supplements/vitamins daily and feels well.        8/27/2024 OTV: 5/5 treatments to the right: Patient reports doing well. She has no complaints of pain, discomfort or fatigue. Give d/c instructions and follow up appointment.  The patient is a 91 year  old woman who was recently noted on screening mammogram 4/18/24 to have "grouped calcifications in the medial right breast and small asymmetry superiorly in the right breast. Diagnostic mammogram and ultrasound were recommended.   Diagnostic mammogram and ultrasound on 5/1/24 showed indeterminant calcifications in the right breast at 5:00 location for which stereotactic core biopsy was recommended.    On 5/28/2024, she had a biopsy of the right breast abnormality (tissue and calcifications) at 5 o'clock and pathology showed DCIS, high nuclear grade, micropapillary and solid types, associated with necrosis and microcalcifications.  ER negative, NV negative.   On 7/16/24, She underwent a lumpectomy with .  She was found to have DCIS, micropapillary and clinging type with comedonecrosis and calcifications, high nuclear grade, present in eleven out of 18 slides examined (TisNxMx), ER Negative, NV Negative.  Synoptic Summary 1: Breast DCIS - Resection Specimen Procedure:  Excision (less than total mastectomy) Specimen Laterality:   Right Tumor Tumor Site:  Lower inner quadrant;  Clock position Specify Clock Position:   5 o'clock Histologic Type:   Ductal carcinoma in situ Size (Extent) of DCIS:   Cannot be determined Number of Blocks with DCIS:   12 Number of Blocks Examined:   24 Architectural Patterns:   [place];  Micropapillary;  Clinging Nuclear Grade:   Grade III (high) Necrosis:  Present, central (expansive "comedo" necrosis) Microcalcifications:   Present in DCIS Margins Margin Status:   All margins negative for DCIS Distance from DCIS to Closest Margin:    4.5 mm Distance from DCIS to Anterior Margin:    4.5 mm Regional Lymph Nodes Regional Lymph Node Status:   Not applicable (no regional lymph nodes submitted or found) Pathologic Stage Classification (pTNM, AJCC 8th Edition) pT Category:   pTis (DCIS) pN Category:   pN not assigned (no nodes submitted or found).    She has been doing well since surgery.  She  is here to discuss radiation. Med/Onc: Dr. Hoover 8/8/24

## 2025-04-30 NOTE — REASON FOR VISIT
[FreeTextEntry2] : Injury: Left displaced distal clavicle fracture Date of injury: 3/27/2025 status post fall down basement stairs

## 2025-04-30 NOTE — HISTORY OF PRESENT ILLNESS
[de-identified] : The patient is a 91-year-old female, right-hand-dominant, who is approximately 5 weeks status post sustaining a left displaced distal clavicle fracture after fall down her basement steps.  She has been doing well.  She has been in a sling.  She has remained nonweightbearing.  She reports that the pain is significantly improved since when she last saw our PA.  No numbness or tingling.  The patient is well-appearing.  Examination of the left shoulder demonstrates intact skin.  There is no significant swelling.  No gross deformity.  No skin tenting.  She is not tender to palpation over the level of the distal clavicle fracture.  Supple elbow range of motion with no pain.  Active shoulder forward flexion to about 40 degrees today.  Neurovascularly intact distally.  Left clavicle x-rays taken in the office today were personally reviewed, demonstrating a displaced distal clavicle fracture with no significant evidence of interval healing

## 2025-04-30 NOTE — ASSESSMENT
[FreeTextEntry1] : Assessment: Approximate 5 weeks out from sustaining a left displaced distal clavicle fracture  Plan: 1.  Clinical and radiographic findings were reviewed with the patient.  I reviewed today's x-rays with her. 2.  For this injury, I recommended continuing with conservative management.  I discussed the risk of fracture nonunion/malunion.  I discussed that should she progress to a symptomatic nonunion, we can discuss the option of surgical intervention.  I also discussed the risk of skin compromise.  She will monitor the skin over this area. 3.  Continue nonweightbearing to the left upper extremity except for less than 2 to 3 pounds for another week.  Advance to weightbearing as tolerated to left upper extremity after 6 weeks from injury.  Range of motion as tolerated.  I recommend that she discontinue the sling and begin shoulder range of motion. 4.  I recommended starting a course of physical therapy for shoulder range of motion.  I provided her with a referral for this. 5.  I would like to see her back for follow-up in about 6 weeks with repeat x-rays of the left clavicle.  Plan of care discussed with the patient and she is in agreement.  All questions were answered.  I encouraged her to reach out with any questions or concerns.  X-rays needed at next visit: Left clavicle

## 2025-05-28 NOTE — HISTORY OF PRESENT ILLNESS
[FreeTextEntry1] : Shara is a 92 y/o F who is here for a follow up visit. Pt states of doing well.  Her imaging is as follows: 7/16/24. ER/MN negative Pt states of doing well. Pt denies of taking any medications. Pt does not have any breast related complaints at this time.  5/1/24: Mammo--> Bi-Rads 4 Indeterminant calcifications at R breast 5:00 location 1.9cm.  5/28/24: R Breast biopsy (buckle) R breast 5:00 CALS, DCIS high nuclear grade, micropapillary and solid types associated with necrosis and microcalcifications R breast 5:00 tissue, DCIS high nuclear grade, micropapillary and solid types associated with necrosis and microcalcifications  06/14/2024 Tissue Bx Final Diagnosis Two (2) outside slides for review from [facility] labeled "[id number]", clinically "right breast 5:00 axis calcifications", stereotactic guided vacuum-assisted needle core biopsies: - Ductal carcinoma in situ (DCIS), micropapillary type with comedonecrosis and calcifications, high nuclear grade. - The carcinoma demonstrates by immunohistochemical stains (as per report): Estrogen receptor: Negative (<1% nuclear staining) Progesterone receptor: Negative (<1% nuclear staining)  07/15/2024 Right Mammo 1. Breast, right LIQ 5:00 axis mass, localized lumpectomy: - Healing prior biopsy site with ductal carcinoma in situ (DCIS), micropapillary and clinging type with both comedonecrosis and calcifications, high nuclear grade; present in eleven out of eighteen slides examined from this entirely submitted specimen (11/18). - For final surgical margin status please see specimen parts #2-#7 below. - Atrophic fatty breast tissue with an intraductal papilloma and proliferative type fibrocystic changes. - For hormone receptor protein expression status please see the pathology report from the prior needle core biopsy specimen ([id number]). - AJCC 8th Edition Pathologic Stage: pTis (DCIS), pNx, pMx.  2. Breast, right posterior margin, excision: - Benign atrophic fatty breast tissue without histopathologic abnormality. Negative for carcinoma.  3. Breast, right medial margin, excision: - Benign atrophic fatty breast tissue without histopathologic abnormality. Negative for carcinoma.  4. Breast, right superior margin, excision: - Benign atrophic fatty breast tissue without histopathologic abnormality. Negative for carcinoma.  5. Breast, right inferior margin, excision: - Benign atrophic fatty breast tissue without histopathologic abnormality. Negative for carcinoma.  6. Breast, right lateral margin, excision: - Benign atrophic fatty breast tissue containing a small radial sclerosing lesion (radial scar) and proliferative type fibrocystic changes. Negative for carcinoma.  7. Breast, right anterior margin, excision: - Focal high-grade intraductal carcinoma (DCIS) located 4.5 mm from the nearest new inked margin (microscopic measurement).  01/16/2025 Right dx Mammo --> BIRADS2 -There are scattered areas of fibroglandular density. -Architectural distortion with fat necrosis in the right breast is consistent with post lumpectomy change. -Skin thickening and trabecular coarsening are consistent with prior radiation. Recommendation: As per post lumpectomy routine, a follow-up bilateral mammogram is recommended in 4 months.  05/19/2025 B/L Dx Mammo --> BIRADS2 -There are scattered areas of fibroglandular density. -Postsurgical changes noted in the right breast. Recommendation: As per post lumpectomy routine, a follow-up unilateral right mammogram is recommended in 6 months.   RISK FACTOR: - no FHx of breast CA - L breast ADH atypia which she had removed.

## 2025-05-28 NOTE — ASSESSMENT
[FreeTextEntry1] : Shara is a 92 y/o F who is here for a follow up visit. Pt is doing well. On physical exam, there are no discrete masses in right or left breast. There is no nipple discharge or inversion bilaterally. There are no skin changes bilaterally. The imaging looks fine. Pt does not have any breast related complaints at this time.  total time on encounter: 35 mins PLAN: -Right Dx Mammo in 6 months -F/u after imaging

## 2025-05-28 NOTE — END OF VISIT
[FreeTextEntry3] : All medical record entries made by the vadim were at my, LUZ Post, direction and personally dictated by me on 05/28/2025. I have reviewed the chart and agreed that the record accurately reflects my personal performance of the history, physical examination, assessment and plan. I have also personally directed, reviewed and agreed with the chart.

## 2025-05-28 NOTE — ADDENDUM
[FreeTextEntry1] :  Documented by Norris [place] acting as a scribe for Dr. DIAZ [facility] on 05/28/2025.

## 2025-05-28 NOTE — DATA REVIEWED
[FreeTextEntry1] : 	 ACC: [id number]     EXAM:  MG MAMMO DIAG W RONNIE BI#   ORDERED BY: LUZ DIAZ  PROCEDURE DATE:  05/19/2025   INTERPRETATION:  Clinical history: Right breast malignancy status post lumpectomy in July 2024.  The patient reports last clinical breast examination was performed about: Within the past year.  Family history of breast cancer: There is no family history of breast cancer.  Comparisons: Mammograms dating back to 2023.  Views obtained: bilateral full field digital 2D and digital tomosynthesis images .  Computer-aided detection was utilized in the interpretation of this examination.  Breast composition: There are scattered areas of fibroglandular density.  Findings:  Mammogram:  No suspicious mass, microcalcifications or areas of architectural distortion seen in either breast. Postsurgical changes noted in the right breast.  Impression: No mammographic evidence of malignancy.  Recommendation: As per post lumpectomy routine, a follow-up unilateral right mammogram is recommended in 6 months.  BI-RADS Category 2: Benign   The above findings and recommendations were discussed with the patient at the time of the examination.  --- End of Report ---

## 2025-06-09 NOTE — REASON FOR VISIT
[FreeTextEntry2] : Injury: Left displaced distal clavicle fracture Date of injury: 3/27/2025 status post fall down basement stairs.

## 2025-06-09 NOTE — HISTORY OF PRESENT ILLNESS
[de-identified] : The patient is a 91-year-old female, right-hand-dominant, who is approximately 10-1/2 weeks status post sustaining a left displaced distal clavicle fracture after a fall down her basement steps.  She has been doing well.  She completed a course of physical therapy and feels like she has improved maximally with physical therapy.  She reports no pain over the level of the fracture.  She reports that she has regained essentially the same range of motion that she had prior to her injury.  The patient is well-appearing.  Examination of the left shoulder demonstrates intact skin.  No swelling.  No gross deformity.  No skin tenting.  She is not tender to palpation over the level of the distal clavicle fracture.  Active shoulder forward flexion to about 80 to 90 degrees which she reports is about baseline for her.  Neurovascularly intact distally.  Left clavicle x-rays taken in the office today were personally reviewed, demonstrating a displaced distal clavicle fracture with persistent fracture line and no significant evidence of interval healing

## 2025-06-09 NOTE — ASSESSMENT
[FreeTextEntry1] : Assessment: Approximately 10-1/2 weeks out from sustaining a left displaced distal clavicle fracture.  Clinically doing well.  Plan: 1.  Clinical and radiographic findings reviewed with the patient.  I reviewed today's x-rays with her.  I reviewed the finding of a persistent fracture line and likely delayed healing. 2.  She may be progressing to an asymptomatic nonunion.  I discussed this with her and recommended conservative management as she is asymptomatic.  She is in agreement with this. 3.  Continue weightbearing as tolerated to the left upper extremity with range of motion and strengthening as tolerated. 4.  She has completed a course of physical therapy and feels like she was no longer making improvement.  She does not need to continue with physical therapy. 5.  I would be happy to see her back in the office on an as-needed basis for this.  Plan of care discussed with the patient and she is in agreement.  All questions were answered.  I encouraged her to reach out with any questions or concerns.